# Patient Record
(demographics unavailable — no encounter records)

---

## 2025-01-14 NOTE — HISTORY OF PRESENT ILLNESS
[FreeTextEntry1] : Mr. VILLEDA is a 49-year-old male who returns with regard to a history of pituitary adenoma.   He apparently did have low FSH and was sent for pituitary MRI with result 9/2020 showing a 5 x 6 mm apparent adenoma in the right anterior inferior region of the pituitary gland. Stable from MRI in March 2020. He denies any headaches or visual changes. All other pituitary lab testing was normal including prolactin and 1 mg overnight dexamethasone test too was normal.  Saw neurologist Dr. White re arachnoid cyst-told stable and no need for f/u.  He was to have repeated his MRI pituitary March of 2022 but never did carry this out.  Latest Aug 2023 MRI Pituitary w and w/o Cont: Mild downward sloping of the right aspect of the sella with a 7 mm lesion, which may represent a pituitary microadenoma. Chronic inflammatory paranasal sinus disease without evidence for acute sinusitis.  FSH low at 1.2 on labs08/02/24.LH was at 2,.6 at that time with normal IGF-1 and normal TFt's  ____________________________________________________________________________________________________ He too has been on testosterone replacement gel- 1.62 % 20.25 mg/ACt-two pumps daily Denies headaches or visual changes.  Additional medical history includes that of hyperlipidemia, Hypertension, obesity, MARSHA. Pre-Dm --A1c at 6.0 % from Aug 2024.  Additional medications: Atorvastatin 20 mg, HCTZ 25 mg, Losartan 100 mg and Lovaza 4 capsules daily.  Supplements: omega 3 4 per day --Patient is off D3 supplementation at this time.  PCP Dr. Kelby Barrera

## 2025-02-04 NOTE — ASSESSMENT
[FreeTextEntry1] :  HTN - still mildly elevated here in the office after second measurement to check BP at home with his own cuff and report back to me  HLD - on statin and omega 3; give4n multiple CRFs, will proceed with Heart  CT calcium score  Pre-DM - watch diet, lose weight  MARSHA  - on CPAP   pituitary microadenoma / low testosterone - to f/u with endocrinologist in June  hcm flu vaccine today

## 2025-02-04 NOTE — HEALTH RISK ASSESSMENT
[Very Good] : ~his/her~  mood as very good [Yes] : Yes [2 - 4 times a month (2 pts)] : 2-4 times a month (2 points) [No falls in past year] : Patient reported no falls in the past year [0] : 2) Feeling down, depressed, or hopeless: Not at all (0) [PHQ-2 Negative - No further assessment needed] : PHQ-2 Negative - No further assessment needed [Never] : Never [] :  [# Of Children ___] : has [unfilled] children [Reports normal functional visual acuity (ie: able to read med bottle)] : Reports normal functional visual acuity [de-identified] : goes to gym, plays baketball [de-identified] : healthy diet [[id number]] : 0 [Reports changes in vision] : Reports no changes in vision [Reports changes in dental health] : Reports no changes in dental health [ColonoscopyDate] : 02/24

## 2025-06-18 NOTE — HISTORY OF PRESENT ILLNESS
[FreeTextEntry1] : Mr. VILLEDA is a 49-year-old male who returns with regard to a history of pituitary adenoma.   Additional medical history includes that of hyperlipidemia, hypertension, pre-dm, MARSHA, vitamin D insufficiency - The patient's latest A1C returned at 6.0% on 01/28/25.  He apparently did have low fsh and was sent for pituitary MRI with result 9/2020 showing a 5 x 6 mm apparent adenoma in the right anterior inferior region of the pituitary gland. Stable from MRI in March 2020.    All other pituitary lab testing was normal including prolactin and 1 mg overnight dexamethasone test too was normal.  Saw neurologist Dr. White re arachnoid cyst-told stable and no need for f/u.  MRI Pituitary from 08/17/2023 showed mild downward sloping of the right aspect of the sella with a 7 mm lesion, which may represent a pituitary microadenoma.  Latest MRI Pituitary from 01/21/2025: Asymmetry of the sella turcica and pituitary gland with the suspicion of a 4 to 5 mm microadenoma along the right floor. No cavernous sinus or suprasellar extension. Comparison to prior outside exams can be performed when made available.  He denies any headaches or visual changes. ____________________________________________________________________________________________________ He too has been on testosterone replacement gel- 1.62 % 20.25 mg/ACt-two pumps daily.   Additional medications: Atorvastatin 20 mg, HCTZ 25 mg, Losartan 100 mg, And omega 3 4 per day,  Lovaza 4 capsules daily.  Patient is off D3 supplementation at this time.  PCP Dr. Kelby Barrera